# Patient Record
Sex: FEMALE | Race: WHITE | NOT HISPANIC OR LATINO | Employment: FULL TIME | URBAN - METROPOLITAN AREA
[De-identification: names, ages, dates, MRNs, and addresses within clinical notes are randomized per-mention and may not be internally consistent; named-entity substitution may affect disease eponyms.]

---

## 2019-03-28 ENCOUNTER — AMBULATORY - HEALTHEAST (OUTPATIENT)
Dept: PEDIATRICS | Facility: CLINIC | Age: 29
End: 2019-03-28

## 2019-03-31 ENCOUNTER — COMMUNICATION - HEALTHEAST (OUTPATIENT)
Dept: HEALTH INFORMATION MANAGEMENT | Facility: CLINIC | Age: 29
End: 2019-03-31

## 2019-04-02 ENCOUNTER — AMBULATORY - HEALTHEAST (OUTPATIENT)
Dept: PEDIATRICS | Facility: CLINIC | Age: 29
End: 2019-04-02

## 2019-04-06 ENCOUNTER — AMBULATORY - HEALTHEAST (OUTPATIENT)
Dept: PEDIATRICS | Facility: CLINIC | Age: 29
End: 2019-04-06

## 2019-06-27 ENCOUNTER — TRANSFERRED RECORDS (OUTPATIENT)
Dept: HEALTH INFORMATION MANAGEMENT | Facility: CLINIC | Age: 29
End: 2019-06-27

## 2019-09-23 ENCOUNTER — AMBULATORY - HEALTHEAST (OUTPATIENT)
Dept: NURSING | Facility: CLINIC | Age: 29
End: 2019-09-23

## 2019-10-08 ENCOUNTER — PRE VISIT (OUTPATIENT)
Dept: NEUROLOGY | Facility: CLINIC | Age: 29
End: 2019-10-08

## 2019-10-08 NOTE — TELEPHONE ENCOUNTER
FUTURE VISIT INFORMATION      FUTURE VISIT INFORMATION:    Date: 11/25/2019    Time: 2pm    Location: [facility]  REFERRAL INFORMATION:    Referring provider:  Self    Referring providers clinic:      Reason for visit/diagnosis  Migraines     RECORDS REQUESTED FROM:       Clinic name Comments Records Status Imaging Status   [facility]   Requested  Requested                                    Action    Action Taken 11/25/2019 faxed 2nd request for records to [facility]

## 2019-11-18 ASSESSMENT — HEADACHE IMPACT TEST (HIT 6)
HOW OFTEN HAVE YOU FELT FED UP OR IRRITATED BECAUSE OF YOUR HEADACHES: ALWAYS
HOW OFTEN DID HEADACHS LIMIT CONCENTRATION ON WORK OR DAILY ACTIVITY: SOMETIMES
HIT6 TOTAL SCORE: 65
HOW OFTEN DO HEADACHES LIMIT YOUR DAILY ACTIVITIES: SOMETIMES
HOW OFTEN HAVE YOU FELT TOO TIRED TO WORK BECAUSE OF YOUR HEADACHES: RARELY
WHEN YOU HAVE A HEADACHE HOW OFTEN DO YOU WISH YOU COULD LIE DOWN: ALWAYS
WHEN YOU HAVE HEADACHES HOW OFTEN IS THE PAIN SEVERE: VERY OFTEN

## 2019-11-25 ENCOUNTER — OFFICE VISIT (OUTPATIENT)
Dept: NEUROLOGY | Facility: CLINIC | Age: 29
End: 2019-11-25
Payer: COMMERCIAL

## 2019-11-25 VITALS
OXYGEN SATURATION: 95 % | DIASTOLIC BLOOD PRESSURE: 87 MMHG | WEIGHT: 173.1 LBS | HEART RATE: 89 BPM | SYSTOLIC BLOOD PRESSURE: 132 MMHG

## 2019-11-25 DIAGNOSIS — G43.009 MIGRAINE WITHOUT AURA AND WITHOUT STATUS MIGRAINOSUS, NOT INTRACTABLE: Primary | ICD-10-CM

## 2019-11-25 RX ORDER — CEFUROXIME AXETIL 250 MG/1
TABLET ORAL
COMMUNITY
Start: 2017-01-01

## 2019-11-25 RX ORDER — CEFUROXIME AXETIL 250 MG/1
TABLET ORAL
Refills: 2 | COMMUNITY
Start: 2019-10-03

## 2019-11-25 SDOH — HEALTH STABILITY: MENTAL HEALTH: HOW OFTEN DO YOU HAVE A DRINK CONTAINING ALCOHOL?: 2-3 TIMES A WEEK

## 2019-11-25 ASSESSMENT — PAIN SCALES - GENERAL: PAINLEVEL: NO PAIN (0)

## 2019-11-25 NOTE — LETTER
"11/25/2019       RE: Nataly Ladd  [address]  [place] [address]     Dear Colleague,    Thank you for referring your patient, Nataly Ladd, to the [facility] NEUROLOGY at [facility]. Please see a copy of my visit note below.    Re: Nataly Rodriguez      MRN# [id number]  YOB: 1990  Date of Visit: 11/25/2019    OUTPATIENT NEUROLOGY VISIT NOTE    Chief Complaint:  Headache evaluation    History of Present Illness  Nataly Rodriguez is a 29-year-old female presents to the clinic today for headache evaluation.   Baby girl Coleen who is 8 month old (born 03/23/2021) and patient breastfeeding     Headache History:    Onset History: at the age of 18-19 but even earlier than that. Mother has headaches.      Current Headache Pattern:      Frequency (How many headache days per month?): at least once per week for intense headache +at least 3-4 days per mild to moderate headache =12 headaches per month of \"any intensity\"     Duration of Headache: a couple of hours to 7-8 hours if treated with sumatriptan injection then duration is shorter and usually once injection takes care of it usually.      Aura: none   Associated Symptoms:  nausea, light sensitivity and smell sensitivity and noise sensitivity, no vision changes, mental fatigue       Description of Headache Pain & Location:  bilateral in the frontal area and feels pressure and vice and tightness and pain rates at 7-10/10 for severe and 4-7/10 for milder headaches        Do headaches interfere with or prevent usual activities or diminish your productivity at home or work?  Yes and general well being    Headache Treatments Tried:   Cefaly   Have you needed to utilize the Emergency Room to treat your headache symptoms?  In September of 2019 because of severe headache not responding to headache  Are Headaches worsening over time?  Getting worse-more frequent or severe    What makes your headaches better?  dark room, " prescription medication: sumatriptan and quiet room    What makes your headaches worse or triggers your headaches? Light, stress, alcohol     Patient reports that she has IUD   Planning on getting pregnant again in the next 3 years  Anxiety, changing jobs and moving to a new house and start seeing a therapist     Denies history of head or neck trauma, dizziness, vertigo, loss of consciousness, seizure, double vision, blurred vision, hearing difficulty, speech or swallowing difficulty, weakness or numbness in face, arms or legs, urinary or bowel incontinence, coordination problems or gait difficulty, fever or chills.      Neurodiagnostic Testing  CT head -normal  MRI brain none  Lab  Hemoglobin  normal  Past Medical History reviewed and verified with the patient  Preeclampsia  Grinding teeth  Anxiety    Past Surgical History reviewed and verified with the patient  History reviewed. No pertinent surgical history.    Family History reviewed and verified with the patient  Mother has headaches.      Social History:  , , one child  Social History     Tobacco Use     Smoking status: Never Smoker     Smokeless tobacco: Never Used   Substance Use Topics     Alcohol use: Yes     Frequency: 2-3 times a week    reviewed and verified with the patient   No Known Allergies    Current Outpatient Medications   Medication Sig Dispense Refill     Nerve Stimulator (CEFALY ELECTRODE) MISC        SUMAtriptan (IMITREX STATDOSE SYSTEM) 6 MG/0.5ML pen injector kit        SUMAtriptan (IMITREX STATDOSE) 6 MG/0.5ML pen injector kit INJECT 1 SHOT AT ONSET OF HEADACHE. MAY REPEAT WITH 1 SHOT AFTER 1 HOUR PRN. MAX 2 SHOTS IN 24 H. MAX USE 5 DAYS A MONTH  2   reviewed and verified with the patient    Review of Systems:   A 12-point ROS including constitutional, eyes, ENT, respiratory, cardiovascular, gastroenterology, genitourinary, integumentary, musculoskeletal, neurology, hematology and psychiatric were all reviewed  with the patient and completed at the Neuroscience Services Question nary and as mentioned in the HPI.   Answers for HPI/ROS submitted by the patient on 11/18/2019   General Symptoms: No  Skin Symptoms: No  HENT Symptoms: No  EYE SYMPTOMS: No  HEART SYMPTOMS: No  LUNG SYMPTOMS: No  INTESTINAL SYMPTOMS: No  URINARY SYMPTOMS: No  GYNECOLOGIC SYMPTOMS: No  BREAST SYMPTOMS: No  SKELETAL SYMPTOMS: No  BLOOD SYMPTOMS: No  NERVOUS SYSTEM SYMPTOMS: No  MENTAL HEALTH SYMPTOMS: No  General Exam:   /87 (BP Location: Left arm, Patient Position: Sitting, Cuff Size: Adult Regular)   Pulse 89   Wt 78.5 kg (173 lb 1.6 oz)   SpO2 95%   Breastfeeding Yes   GEN: Awake, NAD; good eye contact, responses appropriately, healthy appearing   HEENT: Head atraumatic/Normocephalic. Scalp normal. Pupils equally round, 4 mm, reactive to light and accommodation, sclera and conjunctiva normal. Fundoscopic examination reveals normal vessels no papilledema.   Neck: Easily moveable without resistance  Heart: S1/S2 appreciated, RRR, no m/r/g, no carotid bruits  Lungs:Lungs are clear to auscultation bilaterally, no wheezes or crackles.   Neurological Examination:  The patient is alert and oriented times four. Speech is fluent without dysarthria.   Cranial nerves:  CN I deferred.   CN II: Intact and full visual fields to confrontation bilaterally. CN III, IV, VI: EOM intact. There is no nystagmus. Has conjugated gaze. Intact direct and consensual pupillary light reflexes.   CN V: Intact and symmetrical to facial sensation in the V1 through V3 bilaterally.   CN VII: Intact and symmetrical eyebrow and lid raise and eyelid closure, smiles and frown.   CN VIII: Intact to finger rub bilaterally.   CN IX and X: The palates elevates symmetrical. The uvula is midline.   CN XII: The tongue protrudes midline with no atrophy or fasciculations.   Motor exam: The patient has a normal bulk and tone throughout. There is no atrophy, fasciculations, clonus, or  abnormal movements appreciated.   Strength Exam:  5/5 strength at shoulder abduction, elbow flexion or extension, wrist flexion or extension, finger abduction, , hip flexion and extension, knee flexion and extension, and dorsiflexion and plantarflexion bilaterally.   Sensation is intact to light touch  throughout.   Reflexes are 2+ and symmetrical at biceps, triceps, brachioradialis, patellar, and Achilles.   Coordination reveals finger-nose-finger with normal speed and accuracy.   Station and gait is normal. There is no ataxia. Can walk on the toes, heels, and tandem walk without difficulty. Has no drift and a negative Romberg. Lhermitte's negative        Assessment and Plan:  Headache and reported headache symptoms appear to be most likely migrainous  in phenotype. Family history of headaches. Non focal Neurological exam today.   Plan discussed with the patient:  Headache log for frequency and duration and sumatriptan use  Acute treatment-sumatriptan injections as needed. Max 12 mg in 24 hours. May take it with sumatriptan pill if needed for headache re-treatment. Max sumatriptan pill dose 100 mg in 24 hours. May try ondansetron for nausea if needed  Headache prevention-propranolol or verapamil -check with your pediatrician if compatible with lactation.   Vitamin B2 200-400 mg daily   Cefaly as needed  Follow up as needed  I discussed all my recommendation with Nataly Rodriguez. The patient verbalizes understanding and comfortable with the plan. The patient has our clinic phone number to call with any questions or concerns. All of the patient's questions were answered from the best of my current knowledge.     Thank you for letting me be a part of the treatment team for Nataly Rodriguez    Time spent with pt answering questions, discussing findings, counseling and coordinating care was more than 50% the appointment time, 50  minutes.         ÁNGEL Gonzalez, CNP  [facility]        Again,  thank you for allowing me to participate in the care of your patient.      Sincerely,    ÁNGEL Vaughn CNP

## 2019-11-25 NOTE — PROGRESS NOTES
"Re: Nataly Rodriguez      MRN# [id number]  YOB: 1990  Date of Visit: 11/25/2019    OUTPATIENT NEUROLOGY VISIT NOTE    Chief Complaint:  Headache evaluation    History of Present Illness  Nataly Rodriguez is a 29-year-old female presents to the clinic today for headache evaluation.   Baby girl Coleen who is 8 month old (born 03/23/2021) and patient breastfeeding     Headache History:    Onset History: at the age of 18-19 but even earlier than that. Mother has headaches.      Current Headache Pattern:      Frequency (How many headache days per month?): at least once per week for intense headache +at least 3-4 days per mild to moderate headache =12 headaches per month of \"any intensity\"     Duration of Headache: a couple of hours to 7-8 hours if treated with sumatriptan injection then duration is shorter and usually once injection takes care of it usually.      Aura: none   Associated Symptoms:  nausea, light sensitivity and smell sensitivity and noise sensitivity, no vision changes, mental fatigue       Description of Headache Pain & Location:  bilateral in the frontal area and feels pressure and vice and tightness and pain rates at 7-10/10 for severe and 4-7/10 for milder headaches        Do headaches interfere with or prevent usual activities or diminish your productivity at home or work?  Yes and general well being    Headache Treatments Tried:   Cefaly   Have you needed to utilize the Emergency Room to treat your headache symptoms?  In September of 2019 because of severe headache not responding to headache  Are Headaches worsening over time?  Getting worse-more frequent or severe    What makes your headaches better?  dark room, prescription medication: sumatriptan and quiet room    What makes your headaches worse or triggers your headaches? Light, stress, alcohol     Patient reports that she has IUD   Planning on getting pregnant again in the next 3 years  Anxiety, changing jobs and moving to a new " house and start seeing a therapist     Denies history of head or neck trauma, dizziness, vertigo, loss of consciousness, seizure, double vision, blurred vision, hearing difficulty, speech or swallowing difficulty, weakness or numbness in face, arms or legs, urinary or bowel incontinence, coordination problems or gait difficulty, fever or chills.      Neurodiagnostic Testing  CT head -normal  MRI brain none  Lab  Hemoglobin  normal  Past Medical History reviewed and verified with the patient  Preeclampsia  Grinding teeth  Anxiety    Past Surgical History reviewed and verified with the patient  History reviewed. No pertinent surgical history.    Family History reviewed and verified with the patient  Mother has headaches.      Social History:  , , one child  Social History     Tobacco Use     Smoking status: Never Smoker     Smokeless tobacco: Never Used   Substance Use Topics     Alcohol use: Yes     Frequency: 2-3 times a week    reviewed and verified with the patient   No Known Allergies    Current Outpatient Medications   Medication Sig Dispense Refill     Nerve Stimulator (CEFALY ELECTRODE) MISC        SUMAtriptan (IMITREX STATDOSE SYSTEM) 6 MG/0.5ML pen injector kit        SUMAtriptan (IMITREX STATDOSE) 6 MG/0.5ML pen injector kit INJECT 1 SHOT AT ONSET OF HEADACHE. MAY REPEAT WITH 1 SHOT AFTER 1 HOUR PRN. MAX 2 SHOTS IN 24 H. MAX USE 5 DAYS A MONTH  2   reviewed and verified with the patient    Review of Systems:   A 12-point ROS including constitutional, eyes, ENT, respiratory, cardiovascular, gastroenterology, genitourinary, integumentary, musculoskeletal, neurology, hematology and psychiatric were all reviewed with the patient and completed at the Neuroscience Services Question nary and as mentioned in the HPI.   Answers for HPI/ROS submitted by the patient on 11/18/2019   General Symptoms: No  Skin Symptoms: No  HENT Symptoms: No  EYE SYMPTOMS: No  HEART SYMPTOMS: No  LUNG SYMPTOMS:  No  INTESTINAL SYMPTOMS: No  URINARY SYMPTOMS: No  GYNECOLOGIC SYMPTOMS: No  BREAST SYMPTOMS: No  SKELETAL SYMPTOMS: No  BLOOD SYMPTOMS: No  NERVOUS SYSTEM SYMPTOMS: No  MENTAL HEALTH SYMPTOMS: No  General Exam:   /87 (BP Location: Left arm, Patient Position: Sitting, Cuff Size: Adult Regular)   Pulse 89   Wt 78.5 kg (173 lb 1.6 oz)   SpO2 95%   Breastfeeding Yes   GEN: Awake, NAD; good eye contact, responses appropriately, healthy appearing   HEENT: Head atraumatic/Normocephalic. Scalp normal. Pupils equally round, 4 mm, reactive to light and accommodation, sclera and conjunctiva normal. Fundoscopic examination reveals normal vessels no papilledema.   Neck: Easily moveable without resistance  Heart: S1/S2 appreciated, RRR, no m/r/g, no carotid bruits  Lungs:Lungs are clear to auscultation bilaterally, no wheezes or crackles.   Neurological Examination:  The patient is alert and oriented times four. Speech is fluent without dysarthria.   Cranial nerves:  CN I deferred.   CN II: Intact and full visual fields to confrontation bilaterally. CN III, IV, VI: EOM intact. There is no nystagmus. Has conjugated gaze. Intact direct and consensual pupillary light reflexes.   CN V: Intact and symmetrical to facial sensation in the V1 through V3 bilaterally.   CN VII: Intact and symmetrical eyebrow and lid raise and eyelid closure, smiles and frown.   CN VIII: Intact to finger rub bilaterally.   CN IX and X: The palates elevates symmetrical. The uvula is midline.   CN XII: The tongue protrudes midline with no atrophy or fasciculations.   Motor exam: The patient has a normal bulk and tone throughout. There is no atrophy, fasciculations, clonus, or abnormal movements appreciated.   Strength Exam:  5/5 strength at shoulder abduction, elbow flexion or extension, wrist flexion or extension, finger abduction, , hip flexion and extension, knee flexion and extension, and dorsiflexion and plantarflexion bilaterally.    Sensation is intact to light touch  throughout.   Reflexes are 2+ and symmetrical at biceps, triceps, brachioradialis, patellar, and Achilles.   Coordination reveals finger-nose-finger with normal speed and accuracy.   Station and gait is normal. There is no ataxia. Can walk on the toes, heels, and tandem walk without difficulty. Has no drift and a negative Romberg. Lhermitte's negative        Assessment and Plan:  Headache and reported headache symptoms appear to be most likely migrainous  in phenotype. Family history of headaches. Non focal Neurological exam today.   Plan discussed with the patient:  Headache log for frequency and duration and sumatriptan use  Acute treatment-sumatriptan injections as needed. Max 12 mg in 24 hours. May take it with sumatriptan pill if needed for headache re-treatment. Max sumatriptan pill dose 100 mg in 24 hours. May try ondansetron for nausea if needed  Headache prevention-propranolol or verapamil -check with your pediatrician if compatible with lactation.   Vitamin B2 200-400 mg daily   Cefaly as needed  Follow up as needed  I discussed all my recommendation with Nataly Rodriguez. The patient verbalizes understanding and comfortable with the plan. The patient has our clinic phone number to call with any questions or concerns. All of the patient's questions were answered from the best of my current knowledge.     Thank you for letting me be a part of the treatment team for Nataly Rodriguez    Time spent with pt answering questions, discussing findings, counseling and coordinating care was more than 50% the appointment time, 50  minutes.         ÁNGEL Gonzalez, CNP  [facility]

## 2019-11-25 NOTE — LETTER
Date:December 16, 2019      Patient was self referred, no letter generated. Do not send.        [facility] Health Information

## 2019-11-25 NOTE — PATIENT INSTRUCTIONS
Plan:  Headache log for frequency and duration and sumatriptan use  Acute treatment-sumatriptan injections as needed. Max 12 mg in 24 hours. May take it with sumatriptan pill if needed for headache re-treatment. Max sumatriptan pill dose 100 mg in 24 hours. May try ondansetron for nausea if needed  Headache prevention-propranolol or verapamil -check with your pediatrician  Vitamin B2 200-400 mg daily   Cefaly as needed  Follow up as needed

## 2019-11-25 NOTE — NURSING NOTE
Chief Complaint   Patient presents with     Headache     [facility] NEW HEADACHE CONSULTATION      Xuan Hendrickson, EMT

## 2020-02-23 ENCOUNTER — HEALTH MAINTENANCE LETTER (OUTPATIENT)
Age: 30
End: 2020-02-23

## 2020-07-13 ENCOUNTER — TELEPHONE (OUTPATIENT)
Dept: NEUROLOGY | Facility: CLINIC | Age: 30
End: 2020-07-13

## 2020-07-13 NOTE — TELEPHONE ENCOUNTER
[facility] Call Center    Phone Message    May a detailed message be left on voicemail: yes     Reason for Call: Other: Nataly is calling in as she recently was seen in the ER for headaches and was referred to our Headache clinic. She has seen Bruna Garcia in Nov 2019 but is requesting to be scheduled with a different provider. Per scheduling protocals encounter being sent for provider request change. Please give her a call back to discuss. Thank you     Action Taken: Message routed to:  [facility] Neurology    Travel Screening: Not Applicable

## 2020-08-03 NOTE — TELEPHONE ENCOUNTER
Patient is going to be seen outside of system.  She will call back if she needs another opinion.    Adelia Cameron

## 2020-08-03 NOTE — TELEPHONE ENCOUNTER
LVM to schedule with Kayla Castellano or Sage Aguirre as a new patient.    Left call back number.    Adelia Cameron

## 2020-12-06 ENCOUNTER — HEALTH MAINTENANCE LETTER (OUTPATIENT)
Age: 30
End: 2020-12-06

## 2021-04-11 ENCOUNTER — HEALTH MAINTENANCE LETTER (OUTPATIENT)
Age: 31
End: 2021-04-11

## 2021-06-19 NOTE — LETTER
Letter by Scott Mitchell at      Author: Scott Mitchell Service: -- Author Type: --    Filed:  Encounter Date: 3/31/2019 Status: (Other)         2019       Nataly Ladd  [address]  [place] [address]    Dear Nataly JULIO CÉSAR Jose Amoejean claude:    We are pleased to provide you with secure, online access to medical information for you and your family. Per your request, we have expanded your account to allow access to the records of the following family members:              Coleen Ladd (privilege ends on 3/22/2031.)     How Do I Login?  1. In your Internet browser, go to [email or web address]  2. Click on Sign Up Now   3. Enter your [employer] Activation Code exactly as it appears below. This code will  14 days after it is generated. You will not need to use this code after you have completed the sign-up process. If you do not sign up before the expiration date, you must request a new code.     [employer] Activation Code: [email or web address]  Expires: 2019 11:31 AM    4. Enter in your date of birth and zip code.  5. Create a [employer] username. Think of one that is secure and easy to remember.  Your username must be between 6 and 20 characters.  6. Create a [employer] password. You can change your password at any time. Your password must be between 8 and 45 characters, contain at least two letters and one number, and contain both upper and lower case letters.  7. Choose a security question, enter your answer, and click Next. This can be used to access [employer] if you forget your password.   8. Enter a valid e-mail address to receive e-mail notifications when new information is available in [employer].  9. Click Sign In.        How Do I Access a Family Member's Account?  10. Select the account you want to access by clicking the [place] with the appropriate patient's name at the top of your screen.   11. You will see a disclaimer page letting you know that you will be viewing a family member's  record. Review the disclaimer and then click Accept Proxy Access Disclaimer to proceed.  12. Once you switch to viewing a family member's record, you can navigate to [facility] pages the same way you would for yourself. You can return to your own account by clicking the [place] at the top of the screen with your name on it.    13. To customize colors and names of the linked accounts, you can select Personalize from the Profile dropdown menu at the top of the screen, then click the Edit button to make changes.      Additional Information  If you have questions, you can e-mail [email or web address] or call [phone] to talk to our [facility] staff. Remember, [facility] is NOT to be used for urgent needs. For medical emergencies, dial 911.

## 2021-06-19 NOTE — LETTER
Letter by Scott Mitchell at      Author: Scott Mitchell Service: -- Author Type: --    Filed:  Encounter Date: 3/31/2019 Status: (Other)         2019       Nataly Ladd  [address]  [place] [address]    Dear Nataly JULIO CÉSAR Jose Amoejean claude:    We are pleased to provide you with secure, online access to medical information for you and your family. Per your request, we have expanded your account to allow access to the records of the following family members:              Coleen Ladd (privilege ends on 3/22/2031.)     How Do I Login?  1. In your Internet browser, go to [email or web address]  2. Click on Sign Up Now   3. Enter your dloHaiti Activation Code exactly as it appears below. This code will  14 days after it is generated. You will not need to use this code after you have completed the sign-up process. If you do not sign up before the expiration date, you must request a new code.     dlo[employer]i Activation Code: [email or web address]  Expires: 2019 11:31 AM    4. Enter in your date of birth and zip code.  5. Create a dloHaiti username. Think of one that is secure and easy to remember.  Your username must be between 6 and 20 characters.  6. Create a dloHaiti password. You can change your password at any time. Your password must be between 8 and 45 characters, contain at least two letters and one number, and contain both upper and lower case letters.  7. Choose a security question, enter your answer, and click Next. This can be used to access dloHaiti if you forget your password.   8. Enter a valid e-mail address to receive e-mail notifications when new information is available in dloHaiti.  9. Click Sign In.        How Do I Access a Family Member's Account?  10. Select the account you want to access by clicking the [place] with the appropriate patient's name at the top of your screen.   11. You will see a disclaimer page letting you know that you will be viewing a family member's  record. Review the disclaimer and then click Accept Proxy Access Disclaimer to proceed.  12. Once you switch to viewing a family member's record, you can navigate to [employer] pages the same way you would for yourself. You can return to your own account by clicking the [place] at the top of the screen with your name on it.    13. To customize colors and names of the linked accounts, you can select Personalize from the Profile dropdown menu at the top of the screen, then click the Edit button to make changes.      Additional Information  If you have questions, you can e-mail [email or web address] or call [phone] to talk to our [facility] staff. Remember, [facility] is NOT to be used for urgent needs. For medical emergencies, dial 911.

## 2021-07-12 ENCOUNTER — MEDICAL CORRESPONDENCE (OUTPATIENT)
Dept: HEALTH INFORMATION MANAGEMENT | Facility: CLINIC | Age: 31
End: 2021-07-12

## 2021-09-25 ENCOUNTER — HEALTH MAINTENANCE LETTER (OUTPATIENT)
Age: 31
End: 2021-09-25

## 2021-09-28 ENCOUNTER — IMMUNIZATION (OUTPATIENT)
Dept: FAMILY MEDICINE | Facility: CLINIC | Age: 31
End: 2021-09-28
Payer: COMMERCIAL

## 2021-09-28 PROCEDURE — 90686 IIV4 VACC NO PRSV 0.5 ML IM: CPT

## 2021-09-28 PROCEDURE — 90471 IMMUNIZATION ADMIN: CPT

## 2021-09-29 ENCOUNTER — NURSE TRIAGE (OUTPATIENT)
Dept: NURSING | Facility: CLINIC | Age: 31
End: 2021-09-29

## 2021-09-29 NOTE — TELEPHONE ENCOUNTER
Patient reports getting flu shot yesterdaym, and felt like she was getting a strange reaction afterward,   She felt like she had a foggy type headache and fatigue.  She reports using her imitrex , and it did not remove the headache  At all. She describes the feeling as pressure behind her eyes.  She denies fever, but reports she has a sore arm where the vaccine was given.  Patient was advised that she should return call if still not feeling well later today.  She is breast feeding, and normally takes zyrtec, and states that does not seem to be helping at all.    Patient agreed to follow up if symptoms remain, to make a virtual appointment with her PCP or other regarding.    Ena Troy RN RN  [facility] Triage/refill nurse    Reason for Disposition    Mild immunization reaction    Immunization reactions, questions about    Additional Information    Negative: Painless lump at Tetanus-diphtheria (Td) injection site    Protocols used: IMMUNIZATION [id number]

## 2021-11-09 ENCOUNTER — MEDICAL CORRESPONDENCE (OUTPATIENT)
Dept: HEALTH INFORMATION MANAGEMENT | Facility: CLINIC | Age: 31
End: 2021-11-09
Payer: COMMERCIAL

## 2022-05-07 ENCOUNTER — HEALTH MAINTENANCE LETTER (OUTPATIENT)
Age: 32
End: 2022-05-07

## 2022-09-09 ENCOUNTER — ALLIED HEALTH/NURSE VISIT (OUTPATIENT)
Dept: FAMILY MEDICINE | Facility: CLINIC | Age: 32
End: 2022-09-09
Payer: COMMERCIAL

## 2022-09-09 DIAGNOSIS — Z23 ENCOUNTER FOR IMMUNIZATION: Primary | ICD-10-CM

## 2022-09-09 PROCEDURE — 90471 IMMUNIZATION ADMIN: CPT

## 2022-09-09 PROCEDURE — 90686 IIV4 VACC NO PRSV 0.5 ML IM: CPT

## 2022-09-09 PROCEDURE — 99207 PR NO CHARGE NURSE ONLY: CPT

## 2023-06-02 ENCOUNTER — HEALTH MAINTENANCE LETTER (OUTPATIENT)
Age: 33
End: 2023-06-02

## 2023-10-18 ENCOUNTER — ALLIED HEALTH/NURSE VISIT (OUTPATIENT)
Dept: FAMILY MEDICINE | Facility: CLINIC | Age: 33
End: 2023-10-18
Payer: COMMERCIAL

## 2023-10-18 DIAGNOSIS — Z23 ENCOUNTER FOR IMMUNIZATION: Primary | ICD-10-CM

## 2023-10-18 PROCEDURE — 90480 ADMN SARSCOV2 VAC 1/ONLY CMP: CPT

## 2023-10-18 PROCEDURE — 90471 IMMUNIZATION ADMIN: CPT

## 2023-10-18 PROCEDURE — 91320 SARSCV2 VAC 30MCG TRS-SUC IM: CPT

## 2023-10-18 PROCEDURE — 90686 IIV4 VACC NO PRSV 0.5 ML IM: CPT

## 2023-10-18 PROCEDURE — 99207 PR NO CHARGE NURSE ONLY: CPT

## 2023-10-18 NOTE — PROGRESS NOTES
Prior to immunization administration, verified patients identity using patient s name and date of birth. Please see Immunization Activity for additional information.     Screening Questionnaire for Adult Immunization    Are you sick today?   No   Do you have allergies to medications, food, a vaccine component or latex?   No   Have you ever had a serious reaction after receiving a vaccination?   No   Do you have a long-term health problem with heart, lung, kidney, or metabolic disease (e.g., diabetes), asthma, a blood disorder, no spleen, complement component deficiency, a cochlear implant, or a spinal fluid leak?  Are you on long-term aspirin therapy?   No   Do you have cancer, leukemia, HIV/AIDS, or any other immune system problem?   No   Do you have a parent, brother, or sister with an immune system problem?   No   In the past 3 months, have you taken medications that affect  your immune system, such as prednisone, other steroids, or anticancer drugs; drugs for the treatment of rheumatoid arthritis, Crohn s disease, or psoriasis; or have you had radiation treatments?   No   Have you had a seizure, or a brain or other nervous system problem?   No   During the past year, have you received a transfusion of blood or blood    products, or been given immune (gamma) globulin or antiviral drug?   No   For women: Are you pregnant or is there a chance you could become       pregnant during the next month?   No   Have you received any vaccinations in the past 4 weeks?   No     Immunization questionnaire answers were all negative.    I have reviewed the following standing orders:   This patient is due and qualifies for the Covid-19 vaccine.     Click here for COVID-19 Standing Order    I have reviewed the vaccines inclusion and exclusion criteria; No concerns regarding eligibility.     This patient is due and qualifies for the Influenza vaccine.    Click here for Influenza Vaccine Standing Order    I have reviewed the  vaccines inclusion and exclusion criteria; No concerns regarding eligibility.     Patient instructed to remain in clinic for 15 minutes afterwards, and to report any adverse reactions.     Screening performed by Tamika Tse MA on 10/18/2023 at 3:30 PM.

## 2024-06-23 ENCOUNTER — HEALTH MAINTENANCE LETTER (OUTPATIENT)
Age: 34
End: 2024-06-23

## 2024-10-19 ENCOUNTER — IMMUNIZATION (OUTPATIENT)
Dept: FAMILY MEDICINE | Facility: CLINIC | Age: 34
End: 2024-10-19
Payer: COMMERCIAL

## 2024-10-19 PROCEDURE — 90471 IMMUNIZATION ADMIN: CPT

## 2024-10-19 PROCEDURE — 90656 IIV3 VACC NO PRSV 0.5 ML IM: CPT

## 2024-12-17 ENCOUNTER — IMMUNIZATION (OUTPATIENT)
Dept: FAMILY MEDICINE | Facility: CLINIC | Age: 34
End: 2024-12-17
Payer: COMMERCIAL

## 2024-12-17 DIAGNOSIS — Z23 ENCOUNTER FOR IMMUNIZATION: Primary | ICD-10-CM

## 2024-12-17 PROCEDURE — 99207 PR NO CHARGE NURSE ONLY: CPT

## 2024-12-17 PROCEDURE — 90480 ADMN SARSCOV2 VAC 1/ONLY CMP: CPT

## 2024-12-17 PROCEDURE — 91320 SARSCV2 VAC 30MCG TRS-SUC IM: CPT

## 2024-12-17 NOTE — PROGRESS NOTES
Prior to immunization administration, verified patients identity using patient s name and date of birth. Please see Immunization Activity for additional information.     Is the patient's temperature normal (100.5 or less)? Yes     Patient MEETS CRITERIA. PROCEED with vaccine administration.      Patient instructed to remain in clinic for 15 minutes afterwards, and to report any adverse reactions.      Link to Ancillary Visit Immunization Standing Orders SmartSet     Screening performed by Livia Lemon CMA on 12/17/2024 at 8:10 AM.

## 2025-07-12 ENCOUNTER — HEALTH MAINTENANCE LETTER (OUTPATIENT)
Age: 35
End: 2025-07-12